# Patient Record
Sex: MALE | Race: WHITE | NOT HISPANIC OR LATINO | Employment: FULL TIME | URBAN - METROPOLITAN AREA
[De-identification: names, ages, dates, MRNs, and addresses within clinical notes are randomized per-mention and may not be internally consistent; named-entity substitution may affect disease eponyms.]

---

## 2023-09-14 ENCOUNTER — OFFICE VISIT (OUTPATIENT)
Dept: URGENT CARE | Facility: CLINIC | Age: 23
End: 2023-09-14
Payer: MEDICAID

## 2023-09-14 VITALS
HEIGHT: 63 IN | WEIGHT: 113 LBS | DIASTOLIC BLOOD PRESSURE: 77 MMHG | SYSTOLIC BLOOD PRESSURE: 119 MMHG | BODY MASS INDEX: 20.02 KG/M2 | RESPIRATION RATE: 16 BRPM | TEMPERATURE: 98 F | HEART RATE: 62 BPM | OXYGEN SATURATION: 97 %

## 2023-09-14 DIAGNOSIS — S61.419A: Primary | ICD-10-CM

## 2023-09-14 PROCEDURE — 99204 PR OFFICE/OUTPT VISIT, NEW, LEVL IV, 45-59 MIN: ICD-10-PCS | Mod: 25,S$GLB,, | Performed by: NURSE PRACTITIONER

## 2023-09-14 PROCEDURE — 90715 TDAP VACCINE 7 YRS/> IM: CPT | Mod: S$GLB,,, | Performed by: NURSE PRACTITIONER

## 2023-09-14 PROCEDURE — 73130 X-RAY EXAM OF HAND: CPT | Mod: TIER,LT,S$GLB, | Performed by: RADIOLOGY

## 2023-09-14 PROCEDURE — 90471 TDAP VACCINE GREATER THAN OR EQUAL TO 7YO IM: ICD-10-PCS | Mod: S$GLB,,, | Performed by: NURSE PRACTITIONER

## 2023-09-14 PROCEDURE — 99204 OFFICE O/P NEW MOD 45 MIN: CPT | Mod: 25,S$GLB,, | Performed by: NURSE PRACTITIONER

## 2023-09-14 PROCEDURE — 73130 XR HAND COMPLETE 3 VIEW LEFT: ICD-10-PCS | Mod: TIER,LT,S$GLB, | Performed by: RADIOLOGY

## 2023-09-14 PROCEDURE — 90715 TDAP VACCINE GREATER THAN OR EQUAL TO 7YO IM: ICD-10-PCS | Mod: S$GLB,,, | Performed by: NURSE PRACTITIONER

## 2023-09-14 PROCEDURE — 90471 IMMUNIZATION ADMIN: CPT | Mod: S$GLB,,, | Performed by: NURSE PRACTITIONER

## 2023-09-14 RX ORDER — MUPIROCIN 20 MG/G
OINTMENT TOPICAL 3 TIMES DAILY
Qty: 1 G | Refills: 0 | Status: SHIPPED | OUTPATIENT
Start: 2023-09-14 | End: 2023-09-24

## 2023-09-14 NOTE — PROGRESS NOTES
"Subjective:      Patient ID: Flora Hanley is a 22 y.o. male.    Vitals:  height is 5' 3" (1.6 m) and weight is 51.3 kg (113 lb). His temporal temperature is 98.3 °F (36.8 °C). His blood pressure is 119/77 and his pulse is 62. His respiration is 16 and oxygen saturation is 97%.     Chief Complaint: Laceration    22-year-old male presents with a laceration to his left hand, palm aspect.  Patient states he was fishing 9:00 a.m. this morning and was struck in his left hand by a Garfish.  States he is not sure if his beak broke off in laceration.  X-ray will be obtained secondary to rule out foreign object. According to Jonathan Preston, last tetanus 2012.  He is amenable to updating today.      Laceration   The incident occurred 6 to 12 hours ago. The laceration is located on the Left hand. The laceration is 2 cm in size. Injury mechanism: Garfish bite. The pain is at a severity of 0/10. The patient is experiencing no pain. It is unknown if a foreign body is present. His tetanus status is out of date.       Constitution: Negative for chills, fever and generalized weakness.   Cardiovascular:  Negative for chest pain and palpitations.   Respiratory:  Negative for shortness of breath.    Musculoskeletal:  Positive for pain and trauma. Negative for joint pain, joint swelling, arthritis and gout.   Skin:  Positive for laceration and puncture wound.        Left hand, palm      Objective:     Physical Exam   Constitutional: He is oriented to person, place, and time. He appears well-developed. He is cooperative.   HENT:   Head: Normocephalic and atraumatic.   Ears:   Right Ear: Hearing, tympanic membrane, external ear and ear canal normal.   Left Ear: Hearing, tympanic membrane, external ear and ear canal normal.   Nose: Nose normal. No mucosal edema or nasal deformity. No epistaxis. Right sinus exhibits no maxillary sinus tenderness and no frontal sinus tenderness. Left sinus exhibits no maxillary sinus tenderness and no frontal sinus " tenderness.   Mouth/Throat: Uvula is midline, oropharynx is clear and moist and mucous membranes are normal. No trismus in the jaw. Normal dentition. No uvula swelling.   Eyes: Conjunctivae and lids are normal.   Neck: Trachea normal and phonation normal. Neck supple.   Cardiovascular: Normal rate, regular rhythm, normal heart sounds and normal pulses.   Pulmonary/Chest: Effort normal and breath sounds normal.   Abdominal: Normal appearance.   Musculoskeletal: Normal range of motion.         General: Normal range of motion.      Right hand: Normal.      Left hand: He exhibits tenderness and laceration. He exhibits no bony tenderness, normal capillary refill and no swelling.        Hands:    Neurological: He is alert and oriented to person, place, and time. He exhibits normal muscle tone.   Skin: Skin is warm, dry and intact. Lacerations - upper ext.:  left hand        Comments: 2 cm superficial laceration to left palm, mild pain, scant bleeding, full ROM to all phalanges.  Cleaned with betadine, and tap water.  Bactroban applied to site, covered with dressing.   Psychiatric: His speech is normal and behavior is normal. Judgment and thought content normal.   Nursing note and vitals reviewed.      Assessment:     1. Superficial laceration of hand, initial encounter    EXAMINATION:  XR HAND COMPLETE 3 VIEW LEFT     CLINICAL HISTORY:  . Laceration without foreign body of left hand, initial encounter     TECHNIQUE:  PA, lateral, and oblique views of the left hand were performed.     COMPARISON:  None     FINDINGS:  Three views left hand.     No acute displaced fracture or dislocation of the hand.  No radiopaque foreign body.  There may be mild edema overlying the dorsal aspect of the hand at the level of the metacarpophalangeal joints.     Impression:     1. No acute displaced fracture or dislocation of the hand.  No convincing radiopaque foreign body.        Electronically signed by: Bill Gonzalez MD  Date:                                             09/14/2023  Time:                                           18:47    Plan:     Superficial laceration of hand, initial encounter  -     (In Office Administered) Tdap Vaccine  -     XR HAND COMPLETE 3 VIEW LEFT; Future; Expected date: 09/14/2023  -     mupirocin (BACTROBAN) 2 % ointment; Apply topically 3 (three) times daily. for 10 days  Dispense: 1 g; Refill: 0    Keep your dressing on for 24 hours. Do not wet laceration for 12 hours.  After 24 hours remove the dressing and gently clean wound with soap and water at least twice daily unless more frequently soiled, then clean more frequently.    May apply topical antibiotic (avoid neosporin) to wound to promote healing.   Clean old antibiotic ointment away before new application.      Signs of infection include:  Increase in redness, increase in pain, purulent (pus) drainage. Contact clinic if infection concerns arise.     Keep covered when you are out and about, if the dressing becomes wet, change it immediately. Keep open to air when at home.       You must understand that you've received an Urgent Care treatment only and that you may be released before all your medical problems are known or treated. You, the patient, will arrange for follow up care as instructed.  Follow up with your PCP or specialty clinic as directed in the next 1-2 weeks if not improved or as needed.  You can call [phone] to schedule an appointment with the appropriate provider.  If your condition worsens we recommend that you receive another evaluation at the emergency room immediately or contact your primary medical clinics after hours call service to discuss your concerns.  Please return here or go to the Emergency Department for any concerns or worsening of condition.    If you were prescribed a narcotic or controlled medication, do not drive or operate heavy equipment or machinery while taking these medications.    Thank you for choosing [facility]!

## 2023-09-15 NOTE — PATIENT INSTRUCTIONS
Keep your dressing on for 24 hours. Do not wet laceration for 12 hours.  After 24 hours remove the dressing and gently clean wound with soap and water at least twice daily unless more frequently soiled, then clean more frequently.    May apply topical antibiotic (avoid neosporin) to wound to promote healing.   Clean old antibiotic ointment away before new application.      Signs of infection include:  Increase in redness, increase in pain, purulent (pus) drainage. Contact clinic if infection concerns arise.     Keep covered when you are out and about, if the dressing becomes wet, change it immediately. Keep open to air when at home.       You must understand that you've received an Urgent Care treatment only and that you may be released before all your medical problems are known or treated. You, the patient, will arrange for follow up care as instructed.  Follow up with your PCP or specialty clinic as directed in the next 1-2 weeks if not improved or as needed.  You can call [phone] to schedule an appointment with the appropriate provider.  If your condition worsens we recommend that you receive another evaluation at the emergency room immediately or contact your primary medical clinics after hours call service to discuss your concerns.  Please return here or go to the Emergency Department for any concerns or worsening of condition.    If you were prescribed a narcotic or controlled medication, do not drive or operate heavy equipment or machinery while taking these medications.    Thank you for choosing [facility]!    Our goal in the Urgent Care is to always provide outstanding medical care. You may receive a survey by mail or e-mail in the next week regarding your experience today. We would greatly appreciate you completing and returning the survey. Your feedback provides us with a way to recognize our staff who provide very good care, and it helps us learn how to improve when your experience was  below our aspiration of excellence.      We appreciate you trusting us with your medical care. We hope you feel better soon. We will be happy to take care of you for all of your future medical needs.    Sincerely,    Ben Cross DNP, FNP-C